# Patient Record
Sex: FEMALE | Race: BLACK OR AFRICAN AMERICAN | NOT HISPANIC OR LATINO | Employment: FULL TIME | URBAN - METROPOLITAN AREA
[De-identification: names, ages, dates, MRNs, and addresses within clinical notes are randomized per-mention and may not be internally consistent; named-entity substitution may affect disease eponyms.]

---

## 2022-07-07 ENCOUNTER — OFFICE VISIT (OUTPATIENT)
Dept: DERMATOLOGY | Facility: CLINIC | Age: 52
End: 2022-07-07
Payer: COMMERCIAL

## 2022-07-07 DIAGNOSIS — L21.9 SEBORRHEIC DERMATITIS: Primary | ICD-10-CM

## 2022-07-07 PROCEDURE — 99203 PR OFFICE/OUTPT VISIT, NEW, LEVL III, 30-44 MIN: ICD-10-PCS | Mod: S$GLB,,, | Performed by: DERMATOLOGY

## 2022-07-07 PROCEDURE — 99999 PR PBB SHADOW E&M-NEW PATIENT-LVL III: ICD-10-PCS | Mod: PBBFAC,,, | Performed by: DERMATOLOGY

## 2022-07-07 PROCEDURE — 99999 PR PBB SHADOW E&M-NEW PATIENT-LVL III: CPT | Mod: PBBFAC,,, | Performed by: DERMATOLOGY

## 2022-07-07 PROCEDURE — 99203 OFFICE O/P NEW LOW 30 MIN: CPT | Mod: S$GLB,,, | Performed by: DERMATOLOGY

## 2022-07-07 RX ORDER — KETOCONAZOLE 20 MG/G
CREAM TOPICAL
Qty: 60 G | Refills: 3 | Status: SHIPPED | OUTPATIENT
Start: 2022-07-07

## 2022-07-07 RX ORDER — TRIAMCINOLONE ACETONIDE 0.25 MG/G
CREAM TOPICAL 2 TIMES DAILY PRN
Qty: 80 G | Refills: 1 | Status: SHIPPED | OUTPATIENT
Start: 2022-07-07

## 2022-07-07 RX ORDER — HYDROCHLOROTHIAZIDE 12.5 MG/1
12.5 TABLET ORAL DAILY
COMMUNITY

## 2022-07-07 RX ORDER — KETOCONAZOLE 20 MG/ML
SHAMPOO, SUSPENSION TOPICAL
Qty: 240 ML | Refills: 5 | Status: SHIPPED | OUTPATIENT
Start: 2022-07-07

## 2022-07-07 RX ORDER — FLUOCINOLONE ACETONIDE 0.11 MG/ML
OIL TOPICAL
Qty: 118 ML | Refills: 5 | Status: SHIPPED | OUTPATIENT
Start: 2022-07-07

## 2022-07-07 RX ORDER — PANTOPRAZOLE SODIUM 20 MG/1
20 TABLET, DELAYED RELEASE ORAL DAILY
COMMUNITY

## 2022-07-07 RX ORDER — ATORVASTATIN CALCIUM 10 MG/1
10 TABLET, FILM COATED ORAL DAILY
COMMUNITY

## 2022-07-07 RX ORDER — LOSARTAN POTASSIUM 25 MG/1
25 TABLET ORAL DAILY
COMMUNITY

## 2022-07-07 NOTE — PATIENT INSTRUCTIONS
Instructions for seborrheic dermatitis:    Use ketoconazole cream twice a day every day to the affected areas for maintenance treatment  For flares (scaliness, redness, itching), use triamcinolone 0.025% cream (steroid cream) twice a day to the affected areas. Mix ketoconazole cream with triamcinolone 0.025% cream and use twice a day as needed for flares only.  Triamcinolone 0.025% cream is a mild steroid and should not be used for periods longer than 2-4 weeks at a time.  Use ketoconazole shampoo 1 times/week on scalp, lather, let sit 5-10 minutes, then rinse.   Use fluocinolone scalp oil 1-2 times/day as a scalp moisturizer.  Can apply to damp scalp after shampooing hair with ketoconazole shampoo as a more intense treatment once a week. Do not use on face.

## 2022-07-07 NOTE — PROGRESS NOTES
Subjective:       Patient ID:  Theresa Coleman is a 51 y.o. female who presents for   Chief Complaint   Patient presents with    Psoriasis     History of Present Illness: The patient presents with chief complaint of dermatitis.  Interested in dreads. Recommend to see derm by her loctician.   Location: scalp  Duration: years  Signs/Symptoms: itching     Prior treatments: n/a      Review of Systems   Constitutional: Negative for fever and chills.   Gastrointestinal: Negative for nausea and vomiting.   Skin: Positive for itching, rash, dry skin and activity-related sunscreen use. Negative for daily sunscreen use and recent sunburn.   Hematologic/Lymphatic: Does not bruise/bleed easily.        Objective:    Physical Exam   Constitutional: She appears well-developed and well-nourished. No distress.   Neurological: She is alert and oriented to person, place, and time. She is not disoriented.   Psychiatric: She has a normal mood and affect.   Skin:   Areas Examined (abnormalities noted in diagram):   Scalp / Hair Palpated and Inspected  Head / Face Inspection Performed  Neck Inspection Performed  RUE Inspected  LUE Inspection Performed  Nails and Digits Inspection Performed               Assessment / Plan:        Seborrheic dermatitis  -     ketoconazole (NIZORAL) 2 % shampoo; Wash hair with medicated shampoo at least 1x/week - let sit on scalp at least 5 minutes prior to rinsing  Dispense: 240 mL; Refill: 5  -     fluocinolone (DERMA-SMOOTHE) 0.01 % external oil; Apply oil to scalp once to twice a day as needed for dryness  Dispense: 118 mL; Refill: 5  -     triamcinolone acetonide 0.025% (KENALOG) 0.025 % cream; Apply topically 2 (two) times daily as needed. Mild steroid. Use sparingly for 1-2 weeks if needed then stop.  Dispense: 80 g; Refill: 1  -     ketoconazole (NIZORAL) 2 % cream; AAA bid  Dispense: 60 g; Refill: 3  -     Discussed diagnosis. Will start ketoconazole cream BID for maintenance with TAC 0.025%  cream BID prn flares for seb derm of face, will add ketoconazole shampoo 2 times/week for seb derm of scalp.           Follow up in about 3 months (around 10/7/2022).

## 2023-05-29 DIAGNOSIS — N63.21 MASS OF UPPER OUTER QUADRANT OF LEFT BREAST: ICD-10-CM

## 2023-05-29 DIAGNOSIS — R92.0 MAMMOGRAPHIC MICROCALCIFICATION: Primary | ICD-10-CM

## 2023-06-12 PROBLEM — N63.21 MASS OF UPPER OUTER QUADRANT OF LEFT BREAST: Status: ACTIVE | Noted: 2023-06-12

## 2023-06-12 PROBLEM — Z80.3 FAMILY HISTORY OF MALIGNANT NEOPLASM OF BREAST: Status: ACTIVE | Noted: 2023-06-12

## 2023-06-12 PROBLEM — R92.0 MAMMOGRAPHIC MICROCALCIFICATION: Status: ACTIVE | Noted: 2023-06-12

## 2023-06-12 PROBLEM — N60.12 FIBROCYSTIC DISEASE OF LEFT BREAST: Status: ACTIVE | Noted: 2023-06-12

## 2024-02-19 DIAGNOSIS — R92.0 MAMMOGRAPHIC MICROCALCIFICATION: Primary | ICD-10-CM

## 2024-02-19 DIAGNOSIS — N63.21 MASS OF UPPER OUTER QUADRANT OF LEFT BREAST: ICD-10-CM

## 2024-02-19 NOTE — PROGRESS NOTES
[facility]  MD Evangelista Aragon NP-C    Date of Service: 3/1/2024      Chief Complaint:   Theresa Coleman is a 53 y.o. female presenting today for  6 month follow up. She is past due for her Mammogram and Ultrasound  She reports no interval changes on her self-breast examination. She was last seen 11/29/20222. She had her sterotactic biopsy 12/1/2022 and she was to follow up in 6 months.     History of Present Illness:   Mrs. Coleman first presented on February 18, 2016 after her recent mammogram demonstrated an area of suspicious microcalcifications in the left breast. Stereotactic biopsy showed fibrocystic changes. She also had some complicated cysts in the left breast and close follow-up was recommended. She did not follow up after 2016. She presented back on November 29th, 2022 after her recent mammogram demonstrated an area of suspicious microcalcifications in the left breast. Stereotactic biopsy revealed Breast tissue with stromal fibrosis, adenosis, usual ductal hyperplasia and microcalcifications with NEM. She also has some complicated cysts in the left breast and close follow-up was recommended. She did not follow up after her biopsy until she presents back March 1, 2024. MD:::Inga Victoria NP-C.    Past Medical History:   Diagnosis Date    Asthma     Family history of malignant neoplasm of breast 06/12/2023    Fibrocystic disease of left breast 06/12/2023    High cholesterol     HTN (hypertension)     Mammographic microcalcification 06/12/2023    Mass of upper outer quadrant of left breast 06/12/2023      Past Surgical History:   Procedure Laterality Date    MAMMO BREAST STEREOTACTIC BREAST BIOPSY LEFT  12/2022    PARTIAL HYSTERECTOMY          Current Outpatient Medications:     atorvastatin (LIPITOR) 10 MG tablet, Take 10 mg by mouth once daily., Disp: , Rfl:     fluocinolone (DERMA-SMOOTHE) 0.01 % external oil, Apply oil to scalp once to  twice a day as needed for dryness, Disp: 118 mL, Rfl: 5    hydroCHLOROthiazide (HYDRODIURIL) 12.5 MG Tab, Take 12.5 mg by mouth once daily., Disp: , Rfl:     ketoconazole (NIZORAL) 2 % cream, AAA bid, Disp: 60 g, Rfl: 3    ketoconazole (NIZORAL) 2 % shampoo, Wash hair with medicated shampoo at least 1x/week - let sit on scalp at least 5 minutes prior to rinsing, Disp: 240 mL, Rfl: 5    losartan (COZAAR) 25 MG tablet, Take 25 mg by mouth once daily., Disp: , Rfl:     pantoprazole (PROTONIX) 20 MG tablet, Take 20 mg by mouth once daily., Disp: , Rfl:     triamcinolone acetonide 0.025% (KENALOG) 0.025 % cream, Apply topically 2 (two) times daily as needed. Mild steroid. Use sparingly for 1-2 weeks if needed then stop., Disp: 80 g, Rfl: 1   Review of patient's allergies indicates:   Allergen Reactions    Shellfish containing products Hives      Social History     Tobacco Use    Smoking status: Never    Smokeless tobacco: Never   Substance Use Topics    Alcohol use: Not on file      Family History   Problem Relation Age of Onset    Breast cancer Sister 46        BRCA neg    Breast cancer Maternal Cousin 50    Ovarian cancer Neg Hx         Review of Systems   Integumentary:  Negative for color change, rash, mole/lesion, breast mass, breast discharge and breast tenderness.   Breast: Negative for mass and tenderness       Physical Exam   HENT:   Head: Normocephalic.   Pulmonary/Chest: Right breast exhibits no inverted nipple, no mass, no nipple discharge, no skin change and no tenderness. Left breast exhibits no inverted nipple, no mass, no nipple discharge, no skin change and no tenderness. No breast swelling.   Genitourinary: No breast swelling.   Musculoskeletal: Lymphadenopathy:      Upper Body:      Right upper body: No supraclavicular or axillary adenopathy.      Left upper body: No supraclavicular or axillary adenopathy.     Neurological: She is alert.      MAMMOGRAM REPORT: Status post left core biopsy. Increased  nodularity in the left. No change in the right.     Ultrasound: Left- Benign cysts with NEM.     NOTE:::We viewed her films together at today's visit.  We discussed the multiple views obtained and the important findings.  Even benign changes were mentioned and her questions were answered.  She knows that she may receive a formal letter or report from the Radiologist.  She is to contact us if she has questions.      Assessment/Plan  1. Mammographic microcalcification  Assessment & Plan:  We reviewed our findings today and her questions were answered.  She understands that her imaging and exams have remained stable (and show nothing concerning).  She is comfortable being followed in a conservative fashion.      She understands the importance of monthly self-breast examination and knows to report any and all changes as they occur.        2. Mass of upper outer quadrant of left breast  Assessment & Plan:  Same as above      3. Fibrocystic disease of left breast  Assessment & Plan:  We discussed our Fibrocystic Mastopathy Protocol in detail. She should take Vitamin E 800 IU everyday x 3 months or until non-tender then can stop Vitamin E vs. continue daily at 400 IU.  The use of ice packs or warms soaks to tender area of the breast may also be of some benefit.  If warm soaks help her tenderness - She can use Aspercreme (unless allergic to Aspirin) on the affected area.  Ibuprofen (if no contraindications) at 800 mg three times per day for 5 days can also relieve many symptoms associated with swollen or inflamed tissue.  She can repeat Ibuprofen for 5 days, but then should be off for 5 days as it may cause gastric upset.  It is a good idea to wear a tight bra during the day and night to minimize movement of the tender area (Sports Bras work well).  Evening Primrose Oil can be bought over the counter and used at a dose of 3000 mg per day to help with any breast pain/tenderness not improved by implementing the above  measures.        4. Family history of malignant neoplasm of breast  Assessment & Plan:  We discussed her family history and how it could impact her own future risks.  We discussed family vs. genetic history and the importance and implications of each.  Genetic Counseling/Testing was offered, and all questions answered to her satisfaction.  She knows that as additional family members are diagnosed - she will need to let us know as this may change follow up and imaging recommendations. Long discussion with her about her family history and the increased risk that is attributable.  We discussed Genetic Counseling/Testing at length and information was given to address these.   Her Lifetime Risk is only 13.31% based on the [employer]IL[employer] Software Tool. The population risk is 9.07%- so she not at a very high risk.   Her family and personal history were confirmed, and I believe this risk to be true based on its limitations.  She knows that there are certain elements that increase/decrease her risk that are not amendable to .  This risk is less than 20% and does not meet the threshold for additional annual imaging with MRI.  She will also not need to be followed in the High-Risk setting and can be seen on an annual basis.     We had a discussion concerning Breast Cancer Risk Reduction and current NCCN Guidelines. She knows that her risk can be lowered slightly with a healthy lifestyle and minimal ETOH use. Being physically active will also help. She should reduce or stay away from OCPs and HRT as possible.                Medical Decision Making:  It is my impression that this patient suffers all conditions contained in this medical document.  Each of these conditions did affect our plan of care and my medical decision making today.  It is my opinion that the medical decision making concerning this patient was of moderate difficulty based on the aforementioned conditions.  Any further recommendations will be communicated to  the patient by me.  I have reviewed and verified her allergies, list of medications, medical and surgical histories, social history, and a pertinent review of symptoms.      Follow up:  1 year and prn    For:  OTTO THAKUR) at        Addendum 3/4/2024 0816: Mammogram and ultrasound- FINDINGS: There are scattered fibroglandular elements noted. Changes of interval stereotactic core biopsy of a small cluster of microcalcifications in the upper left breast which have diminished in prominence, marker clip now noted in the vicinity. Additional second marker clip from remote core biopsy in the central medial left breast again noted. Developing circumscribed low density 1 cm mass in the middle-third-depth lateral left breast and slight delay enlarged circumscribed 2 cm mass in the middle-third-depth upper left breast. No new suspicious findings on the right. Limited left breast ultrasound performed. Benign-appearing anechoic cyst at the 12 o'clock N7 position measures 2.0 1.0 cm and corresponds to the mammographic mass. Benign-appearing anechoic cyst at the left 2 o'clock N5 position measures 1.0 x 0.4 cm and correlates with the mammographic mass. IMPRESSION: Increased size of a benign-appearing cyst at the left 12 and 2 o'clock position. Changes of interval upper left breast area tactic core biopsy. No findings to suggest malignancy on either side. Follow-up mammography is recommended in one year.

## 2024-02-29 NOTE — ASSESSMENT & PLAN NOTE
We discussed her family history and how it could impact her own future risks.  We discussed family vs. genetic history and the importance and implications of each.  Genetic Counseling/Testing was offered, and all questions answered to her satisfaction.  She knows that as additional family members are diagnosed - she will need to let us know as this may change follow up and imaging recommendations. Long discussion with her about her family history and the increased risk that is attributable.  We discussed Genetic Counseling/Testing at length and information was given to address these.   Her Lifetime Risk is only 13.31% based on the NAIL[employer] Software Tool. The population risk is 9.07%- so she not at a very high risk.   Her family and personal history were confirmed, and I believe this risk to be true based on its limitations.  She knows that there are certain elements that increase/decrease her risk that are not amendable to .  This risk is less than 20% and does not meet the threshold for additional annual imaging with MRI.  She will also not need to be followed in the High-Risk setting and can be seen on an annual basis.     We had a discussion concerning Breast Cancer Risk Reduction and current NCCN Guidelines. She knows that her risk can be lowered slightly with a healthy lifestyle and minimal ETOH use. Being physically active will also help. She should reduce or stay away from OCPs and HRT as possible.

## 2024-03-01 ENCOUNTER — OFFICE VISIT (OUTPATIENT)
Dept: SURGERY | Facility: CLINIC | Age: 54
End: 2024-03-01
Payer: COMMERCIAL

## 2024-03-01 VITALS — HEIGHT: 64 IN | WEIGHT: 220 LBS | BODY MASS INDEX: 37.56 KG/M2

## 2024-03-01 DIAGNOSIS — Z80.3 FAMILY HISTORY OF MALIGNANT NEOPLASM OF BREAST: ICD-10-CM

## 2024-03-01 DIAGNOSIS — N60.12 FIBROCYSTIC DISEASE OF LEFT BREAST: ICD-10-CM

## 2024-03-01 DIAGNOSIS — N63.21 MASS OF UPPER OUTER QUADRANT OF LEFT BREAST: ICD-10-CM

## 2024-03-01 DIAGNOSIS — R92.0 MAMMOGRAPHIC MICROCALCIFICATION: Primary | ICD-10-CM

## 2024-03-01 PROCEDURE — 99213 OFFICE O/P EST LOW 20 MIN: CPT | Mod: S$GLB,,, | Performed by: NURSE PRACTITIONER

## 2024-03-01 PROCEDURE — 99999 PR PBB SHADOW E&M-EST. PATIENT-LVL III: CPT | Mod: PBBFAC,,, | Performed by: NURSE PRACTITIONER

## 2025-01-31 ENCOUNTER — PATIENT MESSAGE (OUTPATIENT)
Dept: SURGERY | Facility: CLINIC | Age: 55
End: 2025-01-31
Payer: COMMERCIAL

## 2025-02-20 DIAGNOSIS — R92.0 MAMMOGRAPHIC MICROCALCIFICATION: Primary | ICD-10-CM

## 2025-03-02 NOTE — PROGRESS NOTES
[facility]    Evangelista Larson NP-C      Date of Service: 3/3/2025      Chief Complaint:   Theresa Coleman is a 54 y.o. female presenting today for her annual evaluation.  She is due for a mammogram. She reports no interval changes.     History of Present Illness:   Mrs. Coleman first presented on February 18, 2016 after her recent mammogram demonstrated an area of suspicious microcalcifications in the left breast. Stereotactic biopsy showed fibrocystic changes. She also had some complicated cysts in the left breast and close follow-up was recommended. She did not follow up after 2016. She presented back on November 29th, 2022 after her recent mammogram demonstrated an area of suspicious microcalcifications in the left breast. Stereotactic biopsy revealed Breast tissue with stromal fibrosis, adenosis, usual ductal hyperplasia and microcalcifications with NEM. She also has some complicated cysts in the left breast and close follow-up proved stability. She did not follow up after her biopsy until she presented back March 1, 2024. MD:::Inga Victoria NP-C.     Past Medical History:   Diagnosis Date    Asthma     Family history of malignant neoplasm of breast 06/12/2023    Fibrocystic disease of left breast 06/12/2023    High cholesterol     HTN (hypertension)     Mammographic microcalcification 06/12/2023    Mass of upper outer quadrant of left breast 06/12/2023      Past Surgical History:   Procedure Laterality Date    MAMMO BREAST STEREOTACTIC BREAST BIOPSY LEFT  12/2022    PARTIAL HYSTERECTOMY        Current Medications[1]   Review of patient's allergies indicates:   Allergen Reactions    Shellfish containing products Hives      Social History     Tobacco Use    Smoking status: Never    Smokeless tobacco: Never   Substance Use Topics    Alcohol use: Not on file      Family History   Problem Relation Name Age of Onset    Breast cancer Sister  46        BRCA neg    Breast  cancer Maternal Cousin  50    Ovarian cancer Neg Hx          Review of Systems   Integumentary:  Negative for color change, rash, mole/lesion, breast mass, breast discharge and breast tenderness.   Breast: Negative for mass and tenderness       Physical Exam   Constitutional: She appears well-developed. She is cooperative.   HENT:   Head: Normocephalic.   Cardiovascular:  Normal rate and regular rhythm.            Pulmonary/Chest: She exhibits no tenderness and no bony tenderness. Right breast exhibits no mass, no nipple discharge, no skin change and no tenderness. Left breast exhibits no mass, no nipple discharge, no skin change and no tenderness.   Abdominal: Soft. Normal appearance.   Musculoskeletal: Lymphadenopathy:      Upper Body:      Right upper body: No supraclavicular or axillary adenopathy.      Left upper body: No supraclavicular or axillary adenopathy.     Neurological: She is alert.   Skin: No rash noted.          MAMMOGRAM REPORT: Will obtain Today  ASSESSMENT and PLAN OF CARE     1. Mammographic microcalcification  Assessment & Plan:  We reviewed our findings today and her questions were answered.  She understands that her exams have remained stable (and show nothing concerning).  She is comfortable being followed in a conservative fashion.      She understands the importance of monthly self-breast examination and knows to report any and all changes as they occur.        2. Mass of upper outer quadrant of left breast  Assessment & Plan:  Same as above      3. Fibrocystic disease of left breast  Assessment & Plan:  We discussed our Fibrocystic Mastopathy Protocol in detail. She should take Vitamin E 800 IU everyday x 3 months or until non-tender then can stop Vitamin E vs. continue daily at 400 IU.  The use of ice packs or warms soaks to tender area of the breast may also be of some benefit.  If warm soaks help her tenderness - She can use Aspercreme (unless allergic to Aspirin) on the affected area.   Ibuprofen (if no contraindications) at 800 mg three times per day for 5 days can also relieve many symptoms associated with swollen or inflamed tissue.  She can repeat Ibuprofen for 5 days, but then should be off for 5 days as it may cause gastric upset.  It is a good idea to wear a tight bra during the day and night to minimize movement of the tender area (Sports Bras work well).  Evening Primrose Oil can be bought over the counter and used at a dose of 3000 mg per day to help with any breast pain/tenderness not improved by implementing the above measures.        4. Family history of malignant neoplasm of breast  Assessment & Plan:  We discussed her family history and how it could impact her own future risks.  We discussed family vs. genetic history and the importance and implications of each.  Genetic Counseling/Testing was offered, and all questions answered to her satisfaction.  She knows that as additional family members are diagnosed - she will need to let us know as this may change follow up and imaging recommendations.    We had a discussion concerning Breast Cancer Risk Reduction and current NCCN Guidelines. She knows that her risk can be lowered slightly with a healthy lifestyle and minimal ETOH use. Being physically active will also help. She should reduce or stay away from OCPs and HRT as possible.           Medical Decision Making: It is my impression that this patient suffers all conditions contained in this medical document.  Each of these conditions did affect our plan of care and my medical decision making today.  It is my opinion that the medical decision making concerning this patient was of minimal  difficulty based on the aforementioned conditions.  Any further recommendations will be communicated to the patient by me.  I have reviewed and verified her allergies, list of medications, medical and surgical histories, social history, and a pertinent review of symptoms.     Follow up: 1 year and  PRN    For: Physical Examination and MGM (D) at     Addendum 3/3/2025 1354_ Mammogram and left ultrasound: FINDINGS: There has been a left breast core biopsy. There are scattered fibroglandular elements noted. There are no suspicious masses or suspicious calcifications seen to suggest malignancy. Scattered, circumscribed, benign-appearing masses are demonstrated. Benign-type calcifications are identified.    Ultrasound of the left breast demonstrates a 9 mm by 5 mm x 9 mm cyst at the 2   o'clock N 5 position. A cyst cluster is noted at the 8-9 o'clock N 9 position   measuring 17 mm by 12 mm x 6 ml. A 7 mm by 5 mm x 7 ml cyst is seen at the 10   o'clock N 6 position. Posteriorly at the 10 o'clock N 5 position there is a 14   mm by 17 mm x 6 mm cyst. No suspicious features are identified. No spiculation   or architectural distortion is present. IMPRESSION: Left breast cysts. No evidence of malignancy. No significant change when compared to previous exam. Follow-up mammography is recommended in one   year.            [1]   Current Outpatient Medications:     amLODIPine (NORVASC) 5 MG tablet, Take 5 mg by mouth., Disp: , Rfl:     fluocinolone (DERMA-SMOOTHE) 0.01 % external oil, Apply oil to scalp once to twice a day as needed for dryness, Disp: 118 mL, Rfl: 5    fluticasone-umeclidin-vilanter (TRELEGY ELLIPTA) 200-62.5-25 mcg inhaler, Inhale 1 puff into the lungs., Disp: , Rfl:     ibuprofen (ADVIL,MOTRIN) 800 MG tablet, Take 800 mg by mouth every 8 (eight) hours., Disp: , Rfl:     ketoconazole (NIZORAL) 2 % cream, AAA bid, Disp: 60 g, Rfl: 3    ketoconazole (NIZORAL) 2 % shampoo, Wash hair with medicated shampoo at least 1x/week - let sit on scalp at least 5 minutes prior to rinsing, Disp: 240 mL, Rfl: 5    losartan (COZAAR) 25 MG tablet, Take 25 mg by mouth once daily., Disp: , Rfl:     pantoprazole (PROTONIX) 20 MG tablet, Take 20 mg by mouth once daily., Disp: , Rfl:     rosuvastatin (CRESTOR) 10 MG tablet, Take  10 mg by mouth., Disp: , Rfl:     triamcinolone acetonide 0.025% (KENALOG) 0.025 % cream, Apply topically 2 (two) times daily as needed. Mild steroid. Use sparingly for 1-2 weeks if needed then stop., Disp: 80 g, Rfl: 1    atorvastatin (LIPITOR) 10 MG tablet, Take 10 mg by mouth once daily. (Patient not taking: Reported on 3/3/2025), Disp: , Rfl:     hydroCHLOROthiazide (HYDRODIURIL) 12.5 MG Tab, Take 12.5 mg by mouth once daily. (Patient not taking: Reported on 3/3/2025), Disp: , Rfl:

## 2025-03-02 NOTE — ASSESSMENT & PLAN NOTE
We discussed her family history and how it could impact her own future risks.  We discussed family vs. genetic history and the importance and implications of each.  Genetic Counseling/Testing was offered, and all questions answered to her satisfaction.  She knows that as additional family members are diagnosed - she will need to let us know as this may change follow up and imaging recommendations.    We had a discussion concerning Breast Cancer Risk Reduction and current NCCN Guidelines. She knows that her risk can be lowered slightly with a healthy lifestyle and minimal ETOH use. Being physically active will also help. She should reduce or stay away from OCPs and HRT as possible.

## 2025-03-03 ENCOUNTER — TELEPHONE (OUTPATIENT)
Dept: SURGERY | Facility: CLINIC | Age: 55
End: 2025-03-03

## 2025-03-03 ENCOUNTER — OFFICE VISIT (OUTPATIENT)
Dept: SURGERY | Facility: CLINIC | Age: 55
End: 2025-03-03
Payer: COMMERCIAL

## 2025-03-03 VITALS — HEIGHT: 65 IN | WEIGHT: 223 LBS | BODY MASS INDEX: 37.15 KG/M2

## 2025-03-03 DIAGNOSIS — N63.21 MASS OF UPPER OUTER QUADRANT OF LEFT BREAST: ICD-10-CM

## 2025-03-03 DIAGNOSIS — Z80.3 FAMILY HISTORY OF MALIGNANT NEOPLASM OF BREAST: ICD-10-CM

## 2025-03-03 DIAGNOSIS — N60.12 FIBROCYSTIC DISEASE OF LEFT BREAST: ICD-10-CM

## 2025-03-03 DIAGNOSIS — R92.0 MAMMOGRAPHIC MICROCALCIFICATION: Primary | ICD-10-CM

## 2025-03-03 PROCEDURE — 99999 PR PBB SHADOW E&M-EST. PATIENT-LVL III: CPT | Mod: PBBFAC,,, | Performed by: NURSE PRACTITIONER

## 2025-03-03 PROCEDURE — 99213 OFFICE O/P EST LOW 20 MIN: CPT | Mod: S$GLB,,, | Performed by: NURSE PRACTITIONER

## 2025-03-03 RX ORDER — FLUTICASONE FUROATE, UMECLIDINIUM BROMIDE AND VILANTEROL TRIFENATATE 200; 62.5; 25 UG/1; UG/1; UG/1
1 POWDER RESPIRATORY (INHALATION)
COMMUNITY
Start: 2024-08-01

## 2025-03-03 RX ORDER — AMLODIPINE BESYLATE 5 MG/1
5 TABLET ORAL
COMMUNITY

## 2025-03-03 RX ORDER — IBUPROFEN 800 MG/1
800 TABLET ORAL EVERY 8 HOURS
COMMUNITY
Start: 2024-11-19

## 2025-03-03 RX ORDER — ROSUVASTATIN CALCIUM 10 MG/1
10 TABLET, COATED ORAL
COMMUNITY
Start: 2024-09-19 | End: 2025-09-19